# Patient Record
Sex: MALE | Race: BLACK OR AFRICAN AMERICAN | Employment: FULL TIME
[De-identification: names, ages, dates, MRNs, and addresses within clinical notes are randomized per-mention and may not be internally consistent; named-entity substitution may affect disease eponyms.]

---

## 2024-02-11 ENCOUNTER — HOSPITAL ENCOUNTER (EMERGENCY)
Facility: HOSPITAL | Age: 39
Discharge: HOME | End: 2024-02-12
Payer: COMMERCIAL

## 2024-02-11 VITALS
HEART RATE: 88 BPM | SYSTOLIC BLOOD PRESSURE: 123 MMHG | DIASTOLIC BLOOD PRESSURE: 85 MMHG | OXYGEN SATURATION: 99 % | RESPIRATION RATE: 16 BRPM | WEIGHT: 185 LBS | TEMPERATURE: 98.1 F

## 2024-02-11 DIAGNOSIS — H10.33 ACUTE BACTERIAL CONJUNCTIVITIS OF BOTH EYES: Primary | ICD-10-CM

## 2024-02-11 PROCEDURE — 99283 EMERGENCY DEPT VISIT LOW MDM: CPT

## 2024-02-11 RX ORDER — ERYTHROMYCIN 5 MG/G
OINTMENT OPHTHALMIC
Status: COMPLETED | OUTPATIENT
Start: 2024-02-12 | End: 2024-02-12

## 2024-02-11 RX ORDER — POLYMYXIN B SULFATE AND TRIMETHOPRIM 1; 10000 MG/ML; [USP'U]/ML
1 SOLUTION OPHTHALMIC EVERY 4 HOURS
Qty: 3 ML | Refills: 0 | Status: SHIPPED | OUTPATIENT
Start: 2024-02-11 | End: 2024-02-21

## 2024-02-12 PROCEDURE — 6370000000 HC RX 637 (ALT 250 FOR IP): Performed by: NURSE PRACTITIONER

## 2024-02-12 RX ADMIN — ERYTHROMYCIN: 5 OINTMENT OPHTHALMIC at 00:18

## 2024-02-12 NOTE — DISCHARGE INSTRUCTIONS
Thank you for allowing me to take care of you today.    Please take the entire course of antibiotics even if your symptoms have resolved.    After 24 hours of being on the antibiotic ointment or eyedrops please wash your pillowcases and sheets.    This is contagious for 24 hours after you start antibiotic.    Please return to the emergency department if you have any new or worsening symptoms including vision changes, blurry vision, increased pain, pain with eye movement, or swelling.

## 2024-02-12 NOTE — ED PROVIDER NOTES
[facility] EMERGENCY DEPT  EMERGENCY DEPARTMENT ENCOUNTER       Pt Name: Gabrielle Johnson  MRN: [id number]  Birthdate 1985  Date of evaluation: 2/11/2024  PCP: No primary care provider on file.  Note Started: 11:44 PM 2/11/24     CHIEF COMPLAINT       Chief Complaint   Patient presents with    Conjunctivitis        HISTORY OF PRESENT ILLNESS: 1 or more elements      History From: Patient    Gabrielle Johnson is a 38 y.o. male who presents to ED c/o bilateral red irritated eyes with green discharge from the left eye.  States his daughter has pinkeye and is being treated currently at home.  He denies any vision changes pain with extraocular movement or wearing contacts.  Denies fever or chills.     Nursing Notes were all reviewed and agreed with or any disagreements were addressed in the HPI.      PHYSICAL EXAM      Vitals:    02/11/24 2158   BP: 123/85   Pulse: 88   Resp: 16   Temp: 98.1 °F (36.7 °C)   TempSrc: Oral   SpO2: 99%   Weight: 83.9 kg (185 lb)     Physical Exam  Constitutional:       Appearance: Normal appearance.   Eyes:      General:         Left eye: Discharge present.     Comments: Bilateral sclera injection.  Left scleral injection more pronounced than the right.  There is green discharge coming out of the left eye.  No foreign body noted.  No significant swelling.  No periorbital erythema.  Extraocular movement is intact.  PERRL   Pulmonary:      Effort: Pulmonary effort is normal.   Neurological:      Mental Status: He is alert.              EMERGENCY DEPARTMENT COURSE and DIFFERENTIAL DIAGNOSIS/MDM   Vitals:    Vitals:    02/11/24 2158   BP: 123/85   Pulse: 88   Resp: 16   Temp: 98.1 °F (36.7 °C)   TempSrc: Oral   SpO2: 99%   Weight: 83.9 kg (185 lb)       Patient was given the following medications:  Medications   erythromycin (ROMYCIN) ophthalmic ointment (has no administration in time range)           Records Reviewed (source and summary): Nursing notes.        ED COURSE       Medial Decision

## 2024-02-12 NOTE — ED TRIAGE NOTES
C/o left eye pain and redness that started this morning. Pt denies injury states he thinks its pink eye